# Patient Record
Sex: MALE | Race: WHITE | NOT HISPANIC OR LATINO | URBAN - METROPOLITAN AREA
[De-identification: names, ages, dates, MRNs, and addresses within clinical notes are randomized per-mention and may not be internally consistent; named-entity substitution may affect disease eponyms.]

---

## 2023-07-18 ENCOUNTER — OFFICE VISIT (OUTPATIENT)
Dept: PRIMARY CARE | Facility: CLINIC | Age: 38
End: 2023-07-18
Payer: COMMERCIAL

## 2023-07-18 VITALS
OXYGEN SATURATION: 98 % | WEIGHT: 238 LBS | BODY MASS INDEX: 30.54 KG/M2 | RESPIRATION RATE: 14 BRPM | SYSTOLIC BLOOD PRESSURE: 130 MMHG | HEART RATE: 81 BPM | HEIGHT: 74 IN | DIASTOLIC BLOOD PRESSURE: 82 MMHG

## 2023-07-18 DIAGNOSIS — R21 RASH: ICD-10-CM

## 2023-07-18 DIAGNOSIS — R73.9 HYPERGLYCEMIA: ICD-10-CM

## 2023-07-18 DIAGNOSIS — R63.5 WEIGHT GAIN: ICD-10-CM

## 2023-07-18 DIAGNOSIS — K76.0 HEPATIC STEATOSIS: Primary | ICD-10-CM

## 2023-07-18 PROBLEM — I49.8 ATRIAL ARRHYTHMIA: Status: ACTIVE | Noted: 2022-05-14

## 2023-07-18 PROBLEM — K21.00 GASTROESOPHAGEAL REFLUX DISEASE WITH ESOPHAGITIS: Status: ACTIVE | Noted: 2022-11-09

## 2023-07-18 PROBLEM — G47.00 INSOMNIA: Status: ACTIVE | Noted: 2022-05-14

## 2023-07-18 PROBLEM — F41.9 ANXIETY AND DEPRESSION: Status: ACTIVE | Noted: 2022-05-14

## 2023-07-18 PROBLEM — S33.5XXA LUMBAR SPRAIN: Status: ACTIVE | Noted: 2022-11-09

## 2023-07-18 PROBLEM — S99.912A INJURY OF LEFT ANKLE: Status: ACTIVE | Noted: 2022-11-09

## 2023-07-18 PROBLEM — F32.A ANXIETY AND DEPRESSION: Status: ACTIVE | Noted: 2022-05-14

## 2023-07-18 PROBLEM — H44.819: Status: ACTIVE | Noted: 2022-05-14

## 2023-07-18 PROCEDURE — 99213 OFFICE O/P EST LOW 20 MIN: CPT | Performed by: INTERNAL MEDICINE

## 2023-07-18 PROCEDURE — 1036F TOBACCO NON-USER: CPT | Performed by: INTERNAL MEDICINE

## 2023-07-18 RX ORDER — OMEPRAZOLE 40 MG/1
40 CAPSULE, DELAYED RELEASE ORAL
Qty: 30 CAPSULE | Refills: 11 | COMMUNITY
Start: 2022-08-02 | End: 2023-11-07 | Stop reason: SDUPTHER

## 2023-07-18 RX ORDER — CYCLOBENZAPRINE HCL 10 MG
1 TABLET ORAL DAILY
COMMUNITY
Start: 2019-03-02 | End: 2024-02-15 | Stop reason: WASHOUT

## 2023-07-18 RX ORDER — ASPIRIN 81 MG/1
81 TABLET ORAL
COMMUNITY
Start: 2016-09-23 | End: 2024-02-15 | Stop reason: WASHOUT

## 2023-07-18 NOTE — PATIENT INSTRUCTIONS
THE RASH ON BACK SHOULD BE SEEN BY [facility]ERM, ALONG WITH [facility]ES    2.  FASTING LABS ARE ORDERED FOR YOU    3.  ULTRASOUND LIVER IS ORDERED    4.  FOLLOW UP PENDING LABS AND ULTRASOUND RESULTS, OTHERWISE 2 MONTH FOLLOW UP    5.  PLEASE ASK SLEEPING PARTNER IF YOU STOP BREATHING WHEN YOU SNORE    6.  I SUSPECT THAT IT IS FAT IN THE LIVER THAT IS BOTHERING YOU

## 2023-07-18 NOTE — PROGRESS NOTES
"Subjective   Michael Thomas is a 38 y.o. male who presents for NP ESTABLISH   PT SAYS HE HAS BEEN FEELING FATIGUED ACHY BLOATED 30- 45DAYS   HX OF AFIB   WAS ON PRILOSEC HELPING OUT OF MEDS     HPI   USED TO SEE DR. COMER    TROUBLE WITH STOMACH, FEEL VERY BLOATED, GAINED SOME WEIGHT, DON'T FEEL GOOD    HAVE A WEIRD SPOT ON BACK, DON'T FEEL GOOD, LIKE BREAKING DOWN.    TOOK PRILOSEC IN THE PAST, IT HELPED.    NO HERBALS, NO TRAVEL OUT OF THE COUNTRY.  WAS CARRYING TILE DOWN [employer], TRIPPED AND FELL DOWN STAIRCASE BRUISED RIBS, OCTOBER    LABS AND RADIOGRAPHY FROM [facility] ER REVIEWED REVEAL TRANSAMINITIS, PREVIOUSLY NORMAL IN MARCH 2022 AND EVIDENCE FOR HEPATI\C STEATOSIS      Review of Systems   Constitutional:  Negative for chills, diaphoresis and fever.        PER HPI   Respiratory:  Negative for cough and shortness of breath.    Cardiovascular:  Negative for chest pain and leg swelling.   Gastrointestinal:  Negative for constipation, diarrhea, nausea and vomiting.   Musculoskeletal:  Negative for joint swelling and myalgias.       Objective   /82   Pulse 81   Resp 14   Ht 1.88 m (6' 2\")   Wt 108 kg (238 lb)   SpO2 98%   BMI 30.56 kg/m²     Physical Exam  Vitals reviewed. Nursing note reviewed: OVERWEIGHT.  Constitutional:       General: He is not in acute distress.     Appearance: He is not ill-appearing.   Cardiovascular:      Rate and Rhythm: Normal rate and regular rhythm.      Pulses: Normal pulses.      Heart sounds:      No gallop.   Pulmonary:      Breath sounds: Normal breath sounds. No wheezing, rhonchi or rales.   Abdominal:      General: Abdomen is flat. Bowel sounds are normal. There is no distension.      Palpations: Abdomen is soft. There is no mass.      Tenderness: There is no abdominal tenderness. There is no right CVA tenderness, left CVA tenderness, guarding or rebound.      Comments: NO GROSS HEPATOMEGALY IS APPRECIATED   Musculoskeletal:      Right lower leg: No edema. "      Left lower leg: No edema.   Skin:     Findings: Rash (LARGEB CONFLUENT DARKLY ERYTHEMATOUS MACULAR RASH ON BACK >10 CM WITH STAELLITES, NO EXCORIATION) present.         Assessment/Plan   Problem List Items Addressed This Visit    None  Visit Diagnoses       Hepatic steatosis    -  Primary    Relevant Orders    Comprehensive Metabolic Panel    CBC and Auto Differential    TSH with reflex to Free T4 if abnormal    Lipid Panel    US abdomen limited liver    Weight gain        Relevant Orders    Vitamin B12    Rash        Relevant Orders    Referral to Dermatology    Hyperglycemia        Relevant Orders    Hemoglobin A1C          Patient Instructions    THE RASH ON BACK SHOULD BE SEEN BY DERM, ALONG WITH MOLES    2.  FASTING LABS ARE ORDERED FOR YOU    3.  ULTRASOUND LIVER IS ORDERED    4.  FOLLOW UP PENDING LABS AND ULTRASOUND RESULTS, OTHERWISE 2 MONTH FOLLOW UP    5.  PLEASE ASK SLEEPING PARTNER IF YOU STOP BREATHING WHEN YOU SNORE    6.  I SUSPECT THAT IT IS FAT IN THE LIVER THAT IS BOTHERING YOU

## 2023-07-19 ASSESSMENT — ENCOUNTER SYMPTOMS
JOINT SWELLING: 0
COUGH: 0
CONSTIPATION: 0
SHORTNESS OF BREATH: 0
DIAPHORESIS: 0
NAUSEA: 0
VOMITING: 0
MYALGIAS: 0
DIARRHEA: 0
FEVER: 0
CHILLS: 0

## 2023-07-20 ENCOUNTER — LAB (OUTPATIENT)
Dept: LAB | Facility: LAB | Age: 38
End: 2023-07-20
Payer: COMMERCIAL

## 2023-07-20 DIAGNOSIS — R63.5 WEIGHT GAIN: ICD-10-CM

## 2023-07-20 DIAGNOSIS — R73.9 HYPERGLYCEMIA: ICD-10-CM

## 2023-07-20 DIAGNOSIS — K76.0 HEPATIC STEATOSIS: ICD-10-CM

## 2023-07-20 LAB
ALANINE AMINOTRANSFERASE (SGPT) (U/L) IN SER/PLAS: 91 U/L (ref 10–52)
ALBUMIN (G/DL) IN SER/PLAS: 5 G/DL (ref 3.4–5)
ALKALINE PHOSPHATASE (U/L) IN SER/PLAS: 102 U/L (ref 33–120)
ANION GAP IN SER/PLAS: 16 MMOL/L (ref 10–20)
ASPARTATE AMINOTRANSFERASE (SGOT) (U/L) IN SER/PLAS: 58 U/L (ref 9–39)
BASOPHILS (10*3/UL) IN BLOOD BY AUTOMATED COUNT: 0.04 X10E9/L (ref 0–0.1)
BASOPHILS/100 LEUKOCYTES IN BLOOD BY AUTOMATED COUNT: 0.8 % (ref 0–2)
BILIRUBIN TOTAL (MG/DL) IN SER/PLAS: 0.8 MG/DL (ref 0–1.2)
CALCIUM (MG/DL) IN SER/PLAS: 10.3 MG/DL (ref 8.6–10.3)
CARBON DIOXIDE, TOTAL (MMOL/L) IN SER/PLAS: 29 MMOL/L (ref 21–32)
CHLORIDE (MMOL/L) IN SER/PLAS: 99 MMOL/L (ref 98–107)
CHOLESTEROL (MG/DL) IN SER/PLAS: 294 MG/DL (ref 0–199)
CHOLESTEROL IN HDL (MG/DL) IN SER/PLAS: 73 MG/DL
CHOLESTEROL/HDL RATIO: 4
COBALAMIN (VITAMIN B12) (PG/ML) IN SER/PLAS: 270 PG/ML (ref 211–911)
CREATININE (MG/DL) IN SER/PLAS: 0.88 MG/DL (ref 0.5–1.3)
EOSINOPHILS (10*3/UL) IN BLOOD BY AUTOMATED COUNT: 0.05 X10E9/L (ref 0–0.7)
EOSINOPHILS/100 LEUKOCYTES IN BLOOD BY AUTOMATED COUNT: 1 % (ref 0–6)
ERYTHROCYTE DISTRIBUTION WIDTH (RATIO) BY AUTOMATED COUNT: 12.1 % (ref 11.5–14.5)
ERYTHROCYTE MEAN CORPUSCULAR HEMOGLOBIN CONCENTRATION (G/DL) BY AUTOMATED: 33.5 G/DL (ref 32–36)
ERYTHROCYTE MEAN CORPUSCULAR VOLUME (FL) BY AUTOMATED COUNT: 94 FL (ref 80–100)
ERYTHROCYTES (10*6/UL) IN BLOOD BY AUTOMATED COUNT: 4.63 X10E12/L (ref 4.5–5.9)
GFR MALE: >90 ML/MIN/1.73M2
GLUCOSE (MG/DL) IN SER/PLAS: 86 MG/DL (ref 74–99)
HEMATOCRIT (%) IN BLOOD BY AUTOMATED COUNT: 43.6 % (ref 41–52)
HEMOGLOBIN (G/DL) IN BLOOD: 14.6 G/DL (ref 13.5–17.5)
IMMATURE GRANULOCYTES/100 LEUKOCYTES IN BLOOD BY AUTOMATED COUNT: 0.4 % (ref 0–0.9)
LDL: 196 MG/DL (ref 0–99)
LEUKOCYTES (10*3/UL) IN BLOOD BY AUTOMATED COUNT: 5 X10E9/L (ref 4.4–11.3)
LYMPHOCYTES (10*3/UL) IN BLOOD BY AUTOMATED COUNT: 1.15 X10E9/L (ref 1.2–4.8)
LYMPHOCYTES/100 LEUKOCYTES IN BLOOD BY AUTOMATED COUNT: 23 % (ref 13–44)
MONOCYTES (10*3/UL) IN BLOOD BY AUTOMATED COUNT: 0.44 X10E9/L (ref 0.1–1)
MONOCYTES/100 LEUKOCYTES IN BLOOD BY AUTOMATED COUNT: 8.8 % (ref 2–10)
NEUTROPHILS (10*3/UL) IN BLOOD BY AUTOMATED COUNT: 3.3 X10E9/L (ref 1.2–7.7)
NEUTROPHILS/100 LEUKOCYTES IN BLOOD BY AUTOMATED COUNT: 66 % (ref 40–80)
PLATELETS (10*3/UL) IN BLOOD AUTOMATED COUNT: 226 X10E9/L (ref 150–450)
POTASSIUM (MMOL/L) IN SER/PLAS: 4.1 MMOL/L (ref 3.5–5.3)
PROTEIN TOTAL: 8 G/DL (ref 6.4–8.2)
SODIUM (MMOL/L) IN SER/PLAS: 140 MMOL/L (ref 136–145)
THYROTROPIN (MIU/L) IN SER/PLAS BY DETECTION LIMIT <= 0.05 MIU/L: 2.7 MIU/L (ref 0.44–3.98)
TRIGLYCERIDE (MG/DL) IN SER/PLAS: 126 MG/DL (ref 0–149)
UREA NITROGEN (MG/DL) IN SER/PLAS: 8 MG/DL (ref 6–23)
VLDL: 25 MG/DL (ref 0–40)

## 2023-07-20 PROCEDURE — 36415 COLL VENOUS BLD VENIPUNCTURE: CPT

## 2023-07-20 PROCEDURE — 80053 COMPREHEN METABOLIC PANEL: CPT

## 2023-07-20 PROCEDURE — 83036 HEMOGLOBIN GLYCOSYLATED A1C: CPT

## 2023-07-20 PROCEDURE — 85025 COMPLETE CBC W/AUTO DIFF WBC: CPT

## 2023-07-20 PROCEDURE — 84443 ASSAY THYROID STIM HORMONE: CPT

## 2023-07-20 PROCEDURE — 82607 VITAMIN B-12: CPT

## 2023-07-20 PROCEDURE — 80061 LIPID PANEL: CPT

## 2023-07-21 LAB
ESTIMATED AVERAGE GLUCOSE FOR HBA1C: 111 MG/DL
HEMOGLOBIN A1C/HEMOGLOBIN TOTAL IN BLOOD: 5.5 %

## 2023-07-24 DIAGNOSIS — R74.01 TRANSAMINITIS: Primary | ICD-10-CM

## 2023-11-05 PROBLEM — K76.0 STEATOSIS OF LIVER: Status: ACTIVE | Noted: 2023-07-20

## 2023-11-05 PROBLEM — R21 RASH: Status: ACTIVE | Noted: 2023-11-05

## 2023-11-05 PROBLEM — R63.5 WEIGHT GAIN: Status: ACTIVE | Noted: 2023-07-20

## 2023-11-05 PROBLEM — R73.9 HYPERGLYCEMIA: Status: ACTIVE | Noted: 2023-07-20

## 2023-11-05 RX ORDER — ACETAMINOPHEN 500 MG
500 TABLET ORAL EVERY 6 HOURS PRN
COMMUNITY
Start: 2022-11-09 | End: 2024-02-15 | Stop reason: WASHOUT

## 2023-11-05 RX ORDER — LIDOCAINE 50 MG/G
PATCH TOPICAL
COMMUNITY
Start: 2022-11-09 | End: 2024-02-15 | Stop reason: WASHOUT

## 2023-11-05 RX ORDER — NEOMYCIN SULFATE, POLYMYXIN B SULFATE, AND DEXAMETHASONE 3.5; 10000; 1 MG/G; [USP'U]/G; MG/G
OINTMENT OPHTHALMIC
COMMUNITY
Start: 2023-02-23 | End: 2024-02-15 | Stop reason: WASHOUT

## 2023-11-07 ENCOUNTER — OFFICE VISIT (OUTPATIENT)
Dept: GASTROENTEROLOGY | Facility: CLINIC | Age: 38
End: 2023-11-07
Payer: COMMERCIAL

## 2023-11-07 VITALS
SYSTOLIC BLOOD PRESSURE: 131 MMHG | BODY MASS INDEX: 30.03 KG/M2 | WEIGHT: 234 LBS | HEART RATE: 85 BPM | DIASTOLIC BLOOD PRESSURE: 84 MMHG | HEIGHT: 74 IN

## 2023-11-07 DIAGNOSIS — K76.0 STEATOSIS OF LIVER: ICD-10-CM

## 2023-11-07 DIAGNOSIS — K21.00 GASTROESOPHAGEAL REFLUX DISEASE WITH ESOPHAGITIS WITHOUT HEMORRHAGE: Primary | ICD-10-CM

## 2023-11-07 PROCEDURE — 1036F TOBACCO NON-USER: CPT | Performed by: INTERNAL MEDICINE

## 2023-11-07 PROCEDURE — 99204 OFFICE O/P NEW MOD 45 MIN: CPT | Performed by: INTERNAL MEDICINE

## 2023-11-07 RX ORDER — OMEPRAZOLE 40 MG/1
40 CAPSULE, DELAYED RELEASE ORAL DAILY
Qty: 90 EACH | Refills: 3 | Status: SHIPPED | OUTPATIENT
Start: 2023-11-07 | End: 2024-02-15 | Stop reason: SDUPTHER

## 2023-11-07 ASSESSMENT — ENCOUNTER SYMPTOMS
NERVOUS/ANXIOUS: 1
COLOR CHANGE: 0
SORE THROAT: 0
FEVER: 0
SHORTNESS OF BREATH: 0
EYE ITCHING: 0
FATIGUE: 0
ADENOPATHY: 0
COUGH: 0
HEMATURIA: 0
SLEEP DISTURBANCE: 1
ROS GI COMMENTS: AS IN HPI
HEADACHES: 0
UNEXPECTED WEIGHT CHANGE: 1
BRUISES/BLEEDS EASILY: 0
FLANK PAIN: 0
CONFUSION: 0
ARTHRALGIAS: 1
SEIZURES: 0
DYSURIA: 0
PALPITATIONS: 0
WHEEZING: 0
CHILLS: 0
BACK PAIN: 0
EYE REDNESS: 0
HALLUCINATIONS: 0
EYE DISCHARGE: 0

## 2023-11-07 NOTE — ASSESSMENT & PLAN NOTE
Advised on diet and wt loss.  Wt loss will help both GERD and NAFLD.  Mediterranean diet.  Acute hep panel next time.

## 2023-11-07 NOTE — PATIENT INSTRUCTIONS
Restart omeprazole 40 mg daily, 1/2 hr prior to breakfast.  Return to clinic in 3 months.  Repeat EGD at that time.

## 2023-11-07 NOTE — PROGRESS NOTES
Gastroenterology Office Visit     History of Present Illness:   Michael Thomas is a 38 y.o. male who presents to GI clinic for reflux esophagitis.  Had two EGDs in 5/22 and 7/22 at [facility]; see below for results.  He was given omeprazole 40 mg daily for 1 month after his second EGD.  He has not taken any PPI since.  He had no follow-up with [facility] GI after that.  Currently, he may go a week without any heartburn.  But then he will have it 3 or 4 days in a row.  No particular time of day.  It has been largely inconsistent.  He does drink alcohol socially, and 3 large cups of coffee daily.  He denies associated dysphagia, hematemesis, regurgitation and wt loss.       Has gained 20 lbs over 1 yr.     Review of Systems  Review of Systems   Constitutional:  Positive for unexpected weight change. Negative for chills, fatigue and fever.   HENT:  Negative for ear pain, nosebleeds and sore throat.    Eyes:  Negative for discharge, redness and itching.   Respiratory:  Negative for cough, shortness of breath and wheezing.    Cardiovascular:  Negative for chest pain, palpitations and leg swelling.   Gastrointestinal:         As in HPI   Endocrine: Negative for cold intolerance and heat intolerance.   Genitourinary:  Negative for dysuria, flank pain and hematuria.   Musculoskeletal:  Positive for arthralgias. Negative for back pain and gait problem.   Skin:  Negative for color change and rash.   Neurological:  Negative for seizures, syncope and headaches.   Hematological:  Negative for adenopathy. Does not bruise/bleed easily.   Psychiatric/Behavioral:  Positive for sleep disturbance. Negative for confusion and hallucinations. The patient is nervous/anxious.        Past Medical History  Anxiety  Depression  GERD  Aflutter     Problem List  Patient Active Problem List   Diagnosis    Anxiety and depression    Atrial arrhythmia    Atrial flutter (CMS/HCC)    Dizziness    Gastroesophageal reflux disease with esophagitis     Hemophthalmos    Injury of left ankle    Insomnia    Lumbar sprain    Paroxysmal atrial fibrillation (CMS/HCC)    Hyperglycemia    Rash    Steatosis of liver    Weight gain       Past Surgical History  EGD    Social History   reports that he has never smoked. He has never used smokeless tobacco. He reports current alcohol use. He reports that he does not currently use drugs.     Family History     Denies FH CRC, IBD, celiac disease    Allergies  No Known Allergies    Medications  Current Outpatient Medications   Medication Instructions    acetaminophen (TYLENOL) 500 mg, oral, Every 6 hours PRN    aspirin 81 mg, oral, Daily RT    cyclobenzaprine (Flexeril) 10 mg tablet 1 tablet, oral, Daily    lidocaine (Lidoderm) 5 % patch Apply 1 Patch as directed once daily. to affected area. Remove patch after 12 hours.    neomycin-polymyxin B-dexameth (Polydex) 3.5 mg/g-10,000 unit/g-0.1 % ointment ophthalmic ointment LOCATION: BOTH EYES. APPLY TO EFFECTED AREA TWICE A DAY    omeprazole (PRILOSEC) 40 mg, oral, Daily, Do not crush or chew.        Objective   Visit Vitals  /84   Pulse 85        Physical Exam  General appearance: No acute distress, cooperative.  Eyes: Nonicteric, no redness or proptosis  Ears, nose, mouth, and throat: Moist mucous membranes, tongue normal; no oral lesions; Mallampati 2  Neck: Supple, no lymphadenopathy or thyromegaly  Lungs: Clear to auscultation bilaterally  CV: Regular rate and rhythm, no murmur; no pitting edema in the lower extremities  Abd: soft, non-tender; non-distended; normal active bowel sounds; no scars  Skin: No rashes; no liver stigmata; +small macule in LUQ  MSK: No deformities or joint edema/redness/tenderness  Neuro: Alert and oriented ×3, normal gait       LABS  Component      Latest Ref Rng 7/20/2023   WBC      4.4 - 11.3 x10E9/L 5.0    RBC      4.50 - 5.90 x10E12/L 4.63    HEMOGLOBIN      13.5 - 17.5 g/dL 14.6    HEMATOCRIT      41.0 - 52.0 % 43.6    MCV      80 - 100 fL 94     MCHC      32.0 - 36.0 g/dL 33.5    Platelets      150 - 450 x10E9/L 226        Component      Latest Ref Rng 7/20/2023   GLUCOSE      74 - 99 mg/dL 86    SODIUM      136 - 145 mmol/L 140    POTASSIUM      3.5 - 5.3 mmol/L 4.1    CHLORIDE      98 - 107 mmol/L 99    Bicarbonate      21 - 32 mmol/L 29    Anion Gap      10 - 20 mmol/L 16    Blood Urea Nitrogen      6 - 23 mg/dL 8    Creatinine      0.50 - 1.30 mg/dL 0.88    GFR MALE      >90 mL/min/1.73m2 >90    Calcium      8.6 - 10.3 mg/dL 10.3    Albumin      3.4 - 5.0 g/dL 5.0    Alkaline Phosphatase      33 - 120 U/L 102    Total Protein      6.4 - 8.2 g/dL 8.0    AST      9 - 39 U/L 58 (H)    Bilirubin Total      0.0 - 1.2 mg/dL 0.8    ALT      10 - 52 U/L 91 (H)       Transaminases elevated in 10/22 also    Radiology  RUQ US 7/23: +HM, steatosis    Endoscopy    EGD 7/22: normal S/D; grade D esophagitis; stenosis at [facility]- not dilated  EGD 5/22 at [facility]: esophageal ulcers    Assessment/Plan   Michael Thomas is a 38 y.o. male who presents to GI clinic for GERD/ reflux esophagitis.    Gastroesophageal reflux disease with esophagitis  Restart omeprazole 40 mg daily, 1/2 hr prior to breakfast.  Return to clinic in 3 months.  Repeat EGD at that time.       Steatosis of liver  Advised on diet and wt loss.  Wt loss will help both GERD and NAFLD.  Mediterranean diet.  Acute hep panel next time.         Umair Pichardo MD

## 2024-01-04 ENCOUNTER — APPOINTMENT (OUTPATIENT)
Dept: GASTROENTEROLOGY | Facility: CLINIC | Age: 39
End: 2024-01-04
Payer: COMMERCIAL

## 2024-01-25 ENCOUNTER — APPOINTMENT (OUTPATIENT)
Dept: GASTROENTEROLOGY | Facility: CLINIC | Age: 39
End: 2024-01-25
Payer: COMMERCIAL

## 2024-02-15 ENCOUNTER — TELEMEDICINE (OUTPATIENT)
Dept: GASTROENTEROLOGY | Facility: CLINIC | Age: 39
End: 2024-02-15
Payer: COMMERCIAL

## 2024-02-15 ENCOUNTER — TELEPHONE (OUTPATIENT)
Dept: GASTROENTEROLOGY | Facility: CLINIC | Age: 39
End: 2024-02-15

## 2024-02-15 DIAGNOSIS — K21.00 GASTROESOPHAGEAL REFLUX DISEASE WITH ESOPHAGITIS WITHOUT HEMORRHAGE: Primary | ICD-10-CM

## 2024-02-15 PROCEDURE — 99214 OFFICE O/P EST MOD 30 MIN: CPT | Performed by: INTERNAL MEDICINE

## 2024-02-15 PROCEDURE — 1036F TOBACCO NON-USER: CPT | Performed by: INTERNAL MEDICINE

## 2024-02-15 RX ORDER — OMEPRAZOLE 40 MG/1
40 CAPSULE, DELAYED RELEASE ORAL
Qty: 90 CAPSULE | Refills: 3 | Status: SHIPPED | OUTPATIENT
Start: 2024-02-15 | End: 2024-03-01 | Stop reason: SDUPTHER

## 2024-02-15 NOTE — PROGRESS NOTES
Gastroenterology Office Visit     History of Present Illness:   Since last OV in 11/23, patient has restarted PPI daily.  Has not had any b/t sx's since restarting the medication.  Has bronchitis currently.  He has lost weight since the beginning of the year by cutting out soda, and other foods from diet.  Has lost 20 lbs.      OV in 11/23:  Michael Thomas is a 38 y.o. male who presents to GI clinic for reflux esophagitis.  Had two EGDs in 5/22 and 7/22 at [facility]; see below for results.  He was given omeprazole 40 mg daily for 1 month after his second EGD.  He has not taken any PPI since.  He had no follow-up with [facility] GI after that.  Currently, he may go a week without any heartburn.  But then he will have it 3 or 4 days in a row.  No particular time of day.  It has been largely inconsistent.  He does drink alcohol socially, and 3 large cups of coffee daily.  He denies associated dysphagia, hematemesis, regurgitation and wt loss.       Has gained 20 lbs over 1 yr.     Review of Systems  Constitutional: denies fever/ chills, night sweats, +wt loss   Respiratory: +SOB, fatigue, chest discomfort   CV: denies LE edema  Neuro: denies weakness and difficulty walking    Past Medical History  Anxiety  Depression  GERD  Aflutter     Problem List  Patient Active Problem List   Diagnosis    Anxiety and depression    Atrial arrhythmia    Atrial flutter (CMS/HCC)    Dizziness    Gastroesophageal reflux disease with esophagitis    Hemophthalmos    Injury of left ankle    Insomnia    Lumbar sprain    Paroxysmal atrial fibrillation (CMS/HCC)    Hyperglycemia    Rash    Steatosis of liver    Weight gain       Past Surgical History  EGD    Social History   reports that he has never smoked. He has never used smokeless tobacco. He reports current alcohol use. He reports that he does not currently use drugs.     Family History     Denies FH CRC, IBD, celiac disease    Allergies  No Known Allergies    Medications  Current  Outpatient Medications on File Prior to Visit   Medication Sig Dispense Refill    acetaminophen (Tylenol) 500 mg tablet Take 1 tablet (500 mg) by mouth every 6 hours if needed for mild pain (1 - 3) or moderate pain (4 - 6).      aspirin 81 mg EC tablet Take 1 tablet (81 mg) by mouth once daily.      cyclobenzaprine (Flexeril) 10 mg tablet Take 1 tablet (10 mg) by mouth once daily.      lidocaine (Lidoderm) 5 % patch Apply 1 Patch as directed once daily. to affected area. Remove patch after 12 hours.      neomycin-polymyxin B-dexameth (Polydex) 3.5 mg/g-10,000 unit/g-0.1 % ointment ophthalmic ointment LOCATION: BOTH EYES. APPLY TO EFFECTED AREA TWICE A DAY      omeprazole (PriLOSEC) 40 mg DR capsule Take 1 capsule (40 mg) by mouth once daily. Do not crush or chew. 90 each 3     No current facility-administered medications on file prior to visit.            Objective   No VS          LABS  Component      Latest Ref [facility] 7/20/2023   WBC      4.4 - 11.3 x10E9/L 5.0    RBC      4.50 - 5.90 x10E12/L 4.63    HEMOGLOBIN      13.5 - 17.5 g/dL 14.6    HEMATOCRIT      41.0 - 52.0 % 43.6    MCV      80 - 100 fL 94    MCHC      32.0 - 36.0 g/dL 33.5    Platelets      150 - 450 x10E9/L 226        Component      Latest Ref [facility] 7/20/2023   GLUCOSE      74 - 99 mg/dL 86    SODIUM      136 - 145 mmol/L 140    POTASSIUM      3.5 - 5.3 mmol/L 4.1    CHLORIDE      98 - 107 mmol/L 99    Bicarbonate      21 - 32 mmol/L 29    Anion Gap      10 - 20 mmol/L 16    Blood Urea Nitrogen      6 - 23 mg/dL 8    Creatinine      0.50 - 1.30 mg/dL 0.88    GFR MALE      >90 mL/min/1.73m2 >90    Calcium      8.6 - 10.3 mg/dL 10.3    Albumin      3.4 - 5.0 g/dL 5.0    Alkaline Phosphatase      33 - 120 U/L 102    Total Protein      6.4 - 8.2 g/dL 8.0    AST      9 - 39 U/L 58 (H)    Bilirubin Total      0.0 - 1.2 mg/dL 0.8    ALT      10 - 52 U/L 91 (H)       Transaminases elevated in 10/22 also    Radiology  RUQ US 7/23: +HM,  steatosis    Endoscopy    EGD 7/22: normal S/D; grade D esophagitis; stenosis at [facility]- not dilated  EGD 5/22 at [facility]: esophageal ulcers    Assessment/Plan   Michael Thomas is a 38 y.o. male who presents to GI clinic for GERD/ reflux esophagitis.  Improved since restarting PPI daily.      Gastroesophageal reflux disease with esophagitis  Continue omeprazole 40 mg daily, 1/2 hr prior to breakfast.  Schedule EGD to follow up Class D esophagitis.            Umair Pichardo MD

## 2024-02-20 ENCOUNTER — ANESTHESIA EVENT (OUTPATIENT)
Dept: GASTROENTEROLOGY | Facility: EXTERNAL LOCATION | Age: 39
End: 2024-02-20

## 2024-03-01 ENCOUNTER — ANESTHESIA (OUTPATIENT)
Dept: GASTROENTEROLOGY | Facility: EXTERNAL LOCATION | Age: 39
End: 2024-03-01

## 2024-03-01 ENCOUNTER — HOSPITAL ENCOUNTER (OUTPATIENT)
Dept: GASTROENTEROLOGY | Facility: EXTERNAL LOCATION | Age: 39
Discharge: HOME | End: 2024-03-01
Payer: COMMERCIAL

## 2024-03-01 VITALS
BODY MASS INDEX: 30.03 KG/M2 | RESPIRATION RATE: 18 BRPM | SYSTOLIC BLOOD PRESSURE: 132 MMHG | OXYGEN SATURATION: 98 % | HEART RATE: 82 BPM | HEIGHT: 74 IN | DIASTOLIC BLOOD PRESSURE: 89 MMHG | TEMPERATURE: 98.2 F | WEIGHT: 234 LBS

## 2024-03-01 DIAGNOSIS — K21.00 GASTROESOPHAGEAL REFLUX DISEASE WITH ESOPHAGITIS WITHOUT HEMORRHAGE: ICD-10-CM

## 2024-03-01 PROCEDURE — 88341 IMHCHEM/IMCYTCHM EA ADD ANTB: CPT

## 2024-03-01 PROCEDURE — 0753T DGTZ GLS MCRSCP SLD LEVEL IV: CPT

## 2024-03-01 PROCEDURE — 88312 SPECIAL STAINS GROUP 1: CPT | Performed by: PATHOLOGY

## 2024-03-01 PROCEDURE — 88305 TISSUE EXAM BY PATHOLOGIST: CPT | Performed by: PATHOLOGY

## 2024-03-01 PROCEDURE — 88305 TISSUE EXAM BY PATHOLOGIST: CPT

## 2024-03-01 PROCEDURE — 88312 SPECIAL STAINS GROUP 1: CPT

## 2024-03-01 PROCEDURE — 88342 IMHCHEM/IMCYTCHM 1ST ANTB: CPT | Performed by: PATHOLOGY

## 2024-03-01 PROCEDURE — 43239 EGD BIOPSY SINGLE/MULTIPLE: CPT | Performed by: INTERNAL MEDICINE

## 2024-03-01 PROCEDURE — 0756T DGTZ GLS MCRSCP SLD SPC GRPI: CPT

## 2024-03-01 RX ORDER — SODIUM CHLORIDE 9 MG/ML
20 INJECTION, SOLUTION INTRAVENOUS CONTINUOUS
Status: DISCONTINUED | OUTPATIENT
Start: 2024-03-01 | End: 2024-03-02 | Stop reason: HOSPADM

## 2024-03-01 RX ORDER — OMEPRAZOLE 40 MG/1
40 CAPSULE, DELAYED RELEASE ORAL
Qty: 180 CAPSULE | Refills: 1 | Status: SHIPPED | OUTPATIENT
Start: 2024-03-01 | End: 2025-03-01

## 2024-03-01 RX ORDER — LIDOCAINE HYDROCHLORIDE 20 MG/ML
INJECTION, SOLUTION INFILTRATION; PERINEURAL AS NEEDED
Status: DISCONTINUED | OUTPATIENT
Start: 2024-03-01 | End: 2024-03-01

## 2024-03-01 RX ORDER — ONDANSETRON HYDROCHLORIDE 2 MG/ML
4 INJECTION, SOLUTION INTRAVENOUS ONCE AS NEEDED
Status: DISCONTINUED | OUTPATIENT
Start: 2024-03-01 | End: 2024-03-02 | Stop reason: HOSPADM

## 2024-03-01 RX ORDER — SODIUM CHLORIDE 9 MG/ML
INJECTION, SOLUTION INTRAVENOUS CONTINUOUS PRN
Status: DISCONTINUED | OUTPATIENT
Start: 2024-03-01 | End: 2024-03-01

## 2024-03-01 RX ORDER — PROPOFOL 10 MG/ML
INJECTION, EMULSION INTRAVENOUS AS NEEDED
Status: DISCONTINUED | OUTPATIENT
Start: 2024-03-01 | End: 2024-03-01

## 2024-03-01 RX ADMIN — PROPOFOL 50 MG: 10 INJECTION, EMULSION INTRAVENOUS at 11:08

## 2024-03-01 RX ADMIN — LIDOCAINE HYDROCHLORIDE 2 ML: 20 INJECTION, SOLUTION INFILTRATION; PERINEURAL at 11:03

## 2024-03-01 RX ADMIN — PROPOFOL 50 MG: 10 INJECTION, EMULSION INTRAVENOUS at 11:07

## 2024-03-01 RX ADMIN — PROPOFOL 50 MG: 10 INJECTION, EMULSION INTRAVENOUS at 11:06

## 2024-03-01 RX ADMIN — PROPOFOL 150 MG: 10 INJECTION, EMULSION INTRAVENOUS at 11:03

## 2024-03-01 RX ADMIN — PROPOFOL 100 MG: 10 INJECTION, EMULSION INTRAVENOUS at 11:04

## 2024-03-01 RX ADMIN — SODIUM CHLORIDE: 9 INJECTION, SOLUTION INTRAVENOUS at 10:43

## 2024-03-01 RX ADMIN — PROPOFOL 50 MG: 10 INJECTION, EMULSION INTRAVENOUS at 11:05

## 2024-03-01 ASSESSMENT — PAIN - FUNCTIONAL ASSESSMENT
PAIN_FUNCTIONAL_ASSESSMENT: 0-10

## 2024-03-01 ASSESSMENT — PAIN SCALES - GENERAL
PAINLEVEL_OUTOF10: 0 - NO PAIN
PAIN_LEVEL: 0
PAINLEVEL_OUTOF10: 0 - NO PAIN
PAINLEVEL_OUTOF10: 0 - NO PAIN

## 2024-03-01 ASSESSMENT — COLUMBIA-SUICIDE SEVERITY RATING SCALE - C-SSRS
6. HAVE YOU EVER DONE ANYTHING, STARTED TO DO ANYTHING, OR PREPARED TO DO ANYTHING TO END YOUR LIFE?: NO
1. IN THE PAST MONTH, HAVE YOU WISHED YOU WERE DEAD OR WISHED YOU COULD GO TO SLEEP AND NOT WAKE UP?: NO
2. HAVE YOU ACTUALLY HAD ANY THOUGHTS OF KILLING YOURSELF?: NO

## 2024-03-01 NOTE — H&P
Outpatient Hospital Procedure H&P    Patient Profile-Procedures  Initial Info  Patient Demographics  Name Michael Thoams  Date of Birth 1985  MRN [id number]  Address   [address] [address] [id number] [facility] [address]    Primary Phone Number [phone]  Secondary Phone Number    PCP Jonh Mckenna    Procedure(s):  EGD  Primary contact name and number   Extended Emergency Contact Information  Primary Emergency Contact: Kiersten Cavanaugh   [place]  Mobile Phone: [phone]  Relation: Significant Other    General Health  Weight   Vitals:    03/01/24 1043   Weight: 106 kg (234 lb)     BMI Body mass index is 30.04 kg/m².    Allergies  No Known Allergies    Past Medical History   Past Medical History:   Diagnosis Date    GERD (gastroesophageal reflux disease)        Provider assessment  Diagnosis: GERD, reflux esophagitis    Medication Reviewed - yes  Prior to Admission medications    Medication Sig Start Date End Date Taking? Authorizing Provider   omeprazole (PriLOSEC) 40 mg DR capsule Take 1 capsule (40 mg) by mouth once daily in the morning. Take before meals. Do not crush or chew. 2/15/24 2/14/25 Yes Umair Pichardo MD       Physical Exam  Vitals:    03/01/24 1043   BP: (!) 152/92   Pulse: 79   Resp: 13   Temp: 36.5 °C (97.7 °F)   SpO2: 97%        General: A&Ox3, NAD.  CV: RRR. No murmur.  Resp: CTA bilaterally. No wheezing, rhonchi or rales.   Extrem: No edema.       Oropharyngeal Classification II (hard and soft palate, upper portion of tonsils anduvula visible)  ASA PS Classification 2  Sedation Plan Deep  Procedure Plan - pre-procedural (re)assesment completed by physician:  discharge/transfer patient when discharge criteria met    Umair Pichardo MD  3/1/2024 10:55 AM

## 2024-03-01 NOTE — DISCHARGE INSTRUCTIONS
Patient Instructions Post Procedure      The anesthetics, sedatives or narcotics which were given to you today will be acting in your body for the next 24 hours, so you might feel a little sleepy or groggy.  This feeling should slowly wear off. Carefully read and follow the instructions.     You received sedation today:  - Do not drive or operate any machinery or power tools of any kind.   - No alcoholic beverages today, not even beer or wine.  - Do not make any important decisions or sign any legal documents.  - No over the counter medications that contain alcohol or that may cause drowsiness.    While it is common to experience mild to moderate abdominal distention, gas, or belching after your procedure, if any of these symptoms occur following discharge from the GI Lab or within one week of having your procedure, call the [facility] to be advised whether a visit to your nearest Urgent Care or Emergency Department is indicated.  Take this paper with you if you go.   - If you develop an allergic reaction to the medications that were given during your procedure such as difficulty breathing, rash, hives, severe nausea, vomiting or lightheadedness.  - If you experience chest pain, shortness of breath, severe abdominal pain, fevers and chills.  -If you develop signs and symptoms of bleeding such as blood in your spit, if your stools turn black, tarry, or bloody  - If you have not urinated within 8 hours following your procedure.  - If your IV site becomes painful, red, inflamed, or looks infected.    If you received a biopsy/polypectomy/sphincterotomy the following instructions apply below:  __ Do not use Aspirin containing products, non-steroidal medications or anti-coagulants for one week following your procedure. (Examples of these types of medications are: Advil, Arthrotec, Aleve, Coumadin, Ecotrin, Heparin, Ibuprofen, Indocin, Motrin, Naprosyn, Nuprin, Plavix, Vioxx, and Voltarin, or their generic  forms.  This list is not all-inclusive.  Check with your physician or pharmacist before resuming medications.)   __ Eat a soft diet today.  Avoid foods that are poorly digested for the next 24 hours.  These foods would include: nuts, beans, lettuce, red meats, and fried foods. Start with liquids and advance your diet as tolerated, gradually work up to eating solids.   __ Do not have a Barium Study or Enema for one week.    Your physician recommends the additional following instructions:    -You have a contact number available for emergencies. The signs and symptoms of potential delayed complications were discussed with you. You may return to normal activities tomorrow.  -Resume your previous diet or other if specified.  -Continue your present medications.   -We are waiting for your pathology results, if applicable.  -The findings and recommendations have been discussed with you and/or family.  - Please see Medication Reconciliation Form for new medication/medications prescribed.     If you experience any problems or have any questions following discharge from the GI Lab, please call: [phone] from 7 am- 4:30 pm.  In the event of an emergency please go to the closest Emergency Department or call Dr. Pichardo [phone]

## 2024-03-01 NOTE — ANESTHESIA PREPROCEDURE EVALUATION
Patient: Michael Thomas    Procedure Information       Date/Time: 03/01/24 1100    Scheduled providers: Umair Pichardo MD; Adrienne Peoples RN    Procedure: EGD    Location: [facility]            Relevant Problems   Anesthesia (within normal limits)      Cardiovascular   (+) Atrial arrhythmia   (+) Atrial flutter (CMS/HCC)   (+) Paroxysmal atrial fibrillation (CMS/HCC)      Endocrine (within normal limits)      GI   (+) Gastroesophageal reflux disease with esophagitis      /Renal   (+) Steatosis of liver      Neuro/Psych   (+) Anxiety and depression      Pulmonary (within normal limits)      GI/Hepatic   (+) Steatosis of liver      Hematology (within normal limits)      Musculoskeletal (within normal limits)      Eyes, Ears, Nose, and Throat (within normal limits)      Infectious Disease (within normal limits)       Clinical information reviewed:    Allergies                NPO Detail:  No data recorded     Physical Exam    Airway  Mallampati: II  TM distance: >3 FB  Neck ROM: full     Cardiovascular - normal exam     Dental - normal exam     Pulmonary    Abdominal - normal exam           Anesthesia Plan    History of general anesthesia?: no  History of complications of general anesthesia?: no    ASA 2     MAC     intravenous induction   Anesthetic plan and risks discussed with patient.

## 2024-03-01 NOTE — ANESTHESIA POSTPROCEDURE EVALUATION
Patient: Michael Thomas    Procedure Summary       Date: 03/01/24 Room / Location: [facility]    Anesthesia Start: 1100 Anesthesia Stop: 1114    Procedure: EGD Diagnosis: Gastroesophageal reflux disease with esophagitis without hemorrhage    Scheduled Providers: Umair Pichardo MD; Adrienne Peoples RN Responsible Provider: MARY Arguello    Anesthesia Type: MAC ASA Status: 2            Anesthesia Type: MAC    Vitals Value Taken Time   /80 03/01/24 1114   Temp 36.8 °C (98.2 °F) 03/01/24 1113   Pulse 80 03/01/24 1114   Resp 22 03/01/24 1114   SpO2 94 03/01/24 1114       Anesthesia Post Evaluation    Patient location during evaluation: PACU  Patient participation: waiting for patient participation  Level of consciousness: responsive to physical stimuli  Pain score: 0  Pain management: adequate  Airway patency: patent  Cardiovascular status: blood pressure returned to baseline  Respiratory status: acceptable  Hydration status: acceptable  Postoperative Nausea and Vomiting: none        No notable events documented.

## 2024-03-01 NOTE — PROGRESS NOTES
Had EGD today- class C esophagitis.  I have increased his PPI to bid.  He needs an EGD again in 12 weeks.  Order placed, please call to schedule.    Medication e-scripted to pharmacy.    Umair Pichardo MD

## 2024-03-04 ENCOUNTER — TELEPHONE (OUTPATIENT)
Dept: GASTROENTEROLOGY | Facility: CLINIC | Age: 39
End: 2024-03-04
Payer: COMMERCIAL

## 2024-03-04 NOTE — ADDENDUM NOTE
Encounter addended by: Adrienne Peoples RN on: 3/4/2024 12:24 PM   Actions taken: Contacts section saved, Flowsheet accepted

## 2024-03-18 LAB
LAB AP ASR DISCLAIMER: NORMAL
LABORATORY COMMENT REPORT: NORMAL
PATH REPORT.COMMENTS IMP SPEC: NORMAL
PATH REPORT.FINAL DX SPEC: NORMAL
PATH REPORT.GROSS SPEC: NORMAL
PATH REPORT.RELEVANT HX SPEC: NORMAL
PATH REPORT.TOTAL CANCER: NORMAL

## 2024-03-18 PROCEDURE — 88342 IMHCHEM/IMCYTCHM 1ST ANTB: CPT

## 2024-03-18 PROCEDURE — 88341 IMHCHEM/IMCYTCHM EA ADD ANTB: CPT

## 2024-03-18 PROCEDURE — 88342 IMHCHEM/IMCYTCHM 1ST ANTB: CPT | Performed by: PATHOLOGY

## 2024-03-18 PROCEDURE — 0760T DGTZ GLS MCRSCP SL IMM 1ST: CPT

## 2024-03-18 PROCEDURE — 88341 IMHCHEM/IMCYTCHM EA ADD ANTB: CPT | Performed by: PATHOLOGY

## 2024-05-16 RX ORDER — ONDANSETRON 4 MG/1
TABLET, ORALLY DISINTEGRATING ORAL
COMMUNITY
Start: 2024-02-06

## 2024-05-16 RX ORDER — ALBUTEROL SULFATE 90 UG/1
2 AEROSOL, METERED RESPIRATORY (INHALATION) 4 TIMES DAILY PRN
COMMUNITY
Start: 2024-02-06

## 2024-05-18 ENCOUNTER — ANESTHESIA EVENT (OUTPATIENT)
Dept: GASTROENTEROLOGY | Facility: EXTERNAL LOCATION | Age: 39
End: 2024-05-18

## 2024-05-31 ENCOUNTER — APPOINTMENT (OUTPATIENT)
Dept: GASTROENTEROLOGY | Facility: EXTERNAL LOCATION | Age: 39
End: 2024-05-31
Payer: COMMERCIAL

## 2024-05-31 ENCOUNTER — ANESTHESIA (OUTPATIENT)
Dept: GASTROENTEROLOGY | Facility: EXTERNAL LOCATION | Age: 39
End: 2024-05-31

## 2024-06-28 ENCOUNTER — APPOINTMENT (OUTPATIENT)
Dept: GASTROENTEROLOGY | Facility: EXTERNAL LOCATION | Age: 39
End: 2024-06-28
Payer: COMMERCIAL

## 2024-12-11 DIAGNOSIS — K21.00 GASTROESOPHAGEAL REFLUX DISEASE WITH ESOPHAGITIS WITHOUT HEMORRHAGE: ICD-10-CM

## 2024-12-13 RX ORDER — OMEPRAZOLE 40 MG/1
40 CAPSULE, DELAYED RELEASE ORAL
Qty: 180 CAPSULE | Refills: 0 | Status: SHIPPED | OUTPATIENT
Start: 2024-12-13 | End: 2025-03-13

## 2024-12-23 ENCOUNTER — APPOINTMENT (OUTPATIENT)
Dept: GASTROENTEROLOGY | Facility: EXTERNAL LOCATION | Age: 39
End: 2024-12-23
Payer: COMMERCIAL

## 2025-02-06 ENCOUNTER — APPOINTMENT (OUTPATIENT)
Dept: GASTROENTEROLOGY | Facility: CLINIC | Age: 40
End: 2025-02-06
Payer: COMMERCIAL

## 2025-02-06 VITALS
HEART RATE: 71 BPM | OXYGEN SATURATION: 95 % | BODY MASS INDEX: 29.94 KG/M2 | DIASTOLIC BLOOD PRESSURE: 78 MMHG | HEIGHT: 74 IN | SYSTOLIC BLOOD PRESSURE: 128 MMHG | WEIGHT: 233.3 LBS | TEMPERATURE: 97.7 F

## 2025-02-06 DIAGNOSIS — K76.0 STEATOSIS OF LIVER: Primary | ICD-10-CM

## 2025-02-06 DIAGNOSIS — K21.00 GASTROESOPHAGEAL REFLUX DISEASE WITH ESOPHAGITIS WITHOUT HEMORRHAGE: ICD-10-CM

## 2025-02-06 PROCEDURE — 99214 OFFICE O/P EST MOD 30 MIN: CPT | Performed by: INTERNAL MEDICINE

## 2025-02-06 PROCEDURE — 3008F BODY MASS INDEX DOCD: CPT | Performed by: INTERNAL MEDICINE

## 2025-02-06 PROCEDURE — 1036F TOBACCO NON-USER: CPT | Performed by: INTERNAL MEDICINE

## 2025-02-06 ASSESSMENT — ENCOUNTER SYMPTOMS
NERVOUS/ANXIOUS: 1
UNEXPECTED WEIGHT CHANGE: 0
BACK PAIN: 0
PALPITATIONS: 0
SHORTNESS OF BREATH: 0
COUGH: 0
ARTHRALGIAS: 0
FATIGUE: 0
FEVER: 0
ROS GI COMMENTS: AS IN HPI
CHILLS: 0

## 2025-02-06 NOTE — PATIENT INSTRUCTIONS
Labs as ordered, check Fibroscan.  Read up on Rezdiffra.  Schedule EGD, continue omeprazole twice daily. Return to clinic in 4 months.

## 2025-02-06 NOTE — PROGRESS NOTES
Gastroenterology Office Visit     History of Present Illness:   Since last OV in 11/23, patient has continued omeprazole twice daily before meals.  No b/t HB while on this regimen.  Was having HB daily/nightly prior.  Significantly improved.      Wants to discuss higher transaminases drawn last month.  Usually drinks 10-12 beers 3-4x/wk.  Did dry January after his labs last month.  Has continued abstinence into February.          OV in 11/23:  Michael Thomas is a 38 y.o. male who presents to GI clinic for reflux esophagitis.  Had two EGDs in 5/22 and 7/22 at [facility]; see below for results.  He was given omeprazole 40 mg daily for 1 month after his second EGD.  He has not taken any PPI since.  He had no follow-up with [facility] after that.  Currently, he may go a week without any heartburn.  But then he will have it 3 or 4 days in a row.  No particular time of day.  It has been largely inconsistent.  He does drink alcohol socially, and 3 large cups of coffee daily.  He denies associated dysphagia, hematemesis, regurgitation and wt loss.       Has gained 20 lbs over 1 yr.     Review of Systems  Review of Systems   Constitutional:  Negative for chills, fatigue, fever and unexpected weight change.   Respiratory:  Negative for cough and shortness of breath.    Cardiovascular:  Negative for chest pain, palpitations and leg swelling.   Gastrointestinal:         As in HPI   Musculoskeletal:  Negative for arthralgias, back pain and gait problem.   Psychiatric/Behavioral:  The patient is nervous/anxious.            Past Medical History  Anxiety  Depression  GERD  Aflutter     Problem List  Patient Active Problem List   Diagnosis    Anxiety and depression    Atrial arrhythmia    Atrial flutter (Multi)    Dizziness    Gastroesophageal reflux disease with esophagitis    Hemophthalmos    Injury of left ankle    Insomnia    Lumbar sprain    Paroxysmal atrial fibrillation (Multi)    Hyperglycemia    Rash    Steatosis of liver  "   Weight gain           Past Surgical History  EGD    Social History   reports that he has never smoked. He has never used smokeless tobacco. He reports current alcohol use. He reports that he does not currently use drugs.     Family History     Denies FH CRC, IBD, celiac disease    Allergies  No Known Allergies    Medications  Current Outpatient Medications on File Prior to Visit   Medication Sig Dispense Refill    albuterol 90 mcg/actuation inhaler Inhale 2 puffs 4 times a day as needed.      omeprazole (PriLOSEC) 40 mg DR capsule Take 1 capsule (40 mg) by mouth 2 times a day before meals. Do not crush or chew. 180 capsule 0    ondansetron ODT (Zofran-ODT) 4 mg disintegrating tablet DISSOLVE 1 TABLET BY MOUTH EVERY 6 HOURS AS NEEDED FOR NAUSEA/VOMITING (Patient not taking: Reported on 2/6/2025)       No current facility-administered medications on file prior to visit.            Objective   /78   Pulse 71   Temp 36.5 °C (97.7 °F) (Temporal)   Ht 1.88 m (6' 2\")   Wt 106 kg (233 lb 4.8 oz)   SpO2 95%   BMI 29.95 kg/m²          LABS  Component      Latest Ref Rn 7/20/2023   WBC      4.4 - 11.3 x10E9/L 5.0    RBC      4.50 - 5.90 x10E12/L 4.63    HEMOGLOBIN      13.5 - 17.5 g/dL 14.6    HEMATOCRIT      41.0 - 52.0 % 43.6    MCV      80 - 100 fL 94    MCHC      32.0 - 36.0 g/dL 33.5    Platelets      150 - 450 x10E9/L 226        Component      Latest Ref Rn 7/20/2023   GLUCOSE      74 - 99 mg/dL 86    SODIUM      136 - 145 mmol/L 140    POTASSIUM      3.5 - 5.3 mmol/L 4.1    CHLORIDE      98 - 107 mmol/L 99    Bicarbonate      21 - 32 mmol/L 29    Anion Gap      10 - 20 mmol/L 16    Blood Urea Nitrogen      6 - 23 mg/dL 8    Creatinine      0.50 - 1.30 mg/dL 0.88    GFR MALE      >90 mL/min/1.73m2 >90    Calcium      8.6 - 10.3 mg/dL 10.3    Albumin      3.4 - 5.0 g/dL 5.0    Alkaline Phosphatase      33 - 120 U/L 102    Total Protein      6.4 - 8.2 g/dL 8.0    AST      9 - 39 U/L 58 (H)    Bilirubin " Total      0.0 - 1.2 mg/dL 0.8    ALT      10 - 52 U/L 91 (H)       [facility] labs 1/3/25:           Radiology  RUQ US 7/23: +HM, steatosis    Endoscopy    EGD 7/22: normal S/D; grade D esophagitis; stenosis at [facility]J- not dilated  EGD 5/22 at [facility]: esophageal ulcers    EGD 3/24: 3-cm HH, grade C esophagitis     Assessment/Plan   Michael Thomas is a 39 y.o. male who presents to GI clinic for follow up of GERD/ reflux esophagitis, and abnormal LFTs.    Steatosis of liver- probably combination of NAFLD/ EtOH.   Labs as ordered, check Fibroscan.  Read up on Rezdiffra.  Continue abstinence.     Gastroesophageal reflux disease with esophagitis  Schedule EGD, continue omeprazole twice daily. Return to clinic in 4 months.        Umair Pichardo MD

## 2025-02-07 LAB
A1AT SERPL-MCNC: 152 MG/DL (ref 83–199)
ALBUMIN SERPL-MCNC: 5.2 G/DL (ref 3.6–5.1)
ALBUMIN/GLOB SERPL: 1.9 (CALC) (ref 1–2.5)
ALP SERPL-CCNC: 79 U/L (ref 36–130)
ALT SERPL-CCNC: 43 U/L (ref 9–46)
ANA SER QL IF: NEGATIVE
AST SERPL-CCNC: 27 U/L (ref 10–40)
BILIRUB DIRECT SERPL-MCNC: 0.1 MG/DL
BILIRUB INDIRECT SERPL-MCNC: 0.7 MG/DL (CALC) (ref 0.2–1.2)
BILIRUB SERPL-MCNC: 0.8 MG/DL (ref 0.2–1.2)
CERULOPLASMIN SERPL-MCNC: 26 MG/DL (ref 14–30)
FERRITIN SERPL-MCNC: 239 NG/ML (ref 38–380)
GLOBULIN SER CALC-MCNC: 2.7 G/DL (CALC) (ref 1.9–3.7)
HAV IGM SERPL QL IA: NORMAL
HBV CORE IGM SERPL QL IA: NORMAL
HBV SURFACE AG SERPL QL IA: NORMAL
HCV AB SERPL QL IA: NORMAL
IRON SATN MFR SERPL: 31 % (CALC) (ref 20–48)
IRON SERPL-MCNC: 110 MCG/DL (ref 50–180)
MITOCHONDRIA AB SER QL IF: NEGATIVE
PROT SERPL-MCNC: 7.9 G/DL (ref 6.1–8.1)
SMOOTH MUSCLE AB SER QL IF: NEGATIVE
TIBC SERPL-MCNC: 357 MCG/DL (CALC) (ref 250–425)

## 2025-02-10 ENCOUNTER — ANESTHESIA EVENT (OUTPATIENT)
Dept: GASTROENTEROLOGY | Facility: EXTERNAL LOCATION | Age: 40
End: 2025-02-10

## 2025-02-28 ENCOUNTER — ANESTHESIA (OUTPATIENT)
Dept: GASTROENTEROLOGY | Facility: EXTERNAL LOCATION | Age: 40
End: 2025-02-28

## 2025-02-28 ENCOUNTER — APPOINTMENT (OUTPATIENT)
Dept: GASTROENTEROLOGY | Facility: EXTERNAL LOCATION | Age: 40
End: 2025-02-28
Payer: COMMERCIAL

## 2025-02-28 VITALS
WEIGHT: 240 LBS | DIASTOLIC BLOOD PRESSURE: 81 MMHG | RESPIRATION RATE: 15 BRPM | HEART RATE: 80 BPM | SYSTOLIC BLOOD PRESSURE: 128 MMHG | BODY MASS INDEX: 30.8 KG/M2 | TEMPERATURE: 98.2 F | OXYGEN SATURATION: 100 % | HEIGHT: 74 IN

## 2025-02-28 DIAGNOSIS — K21.00 GASTROESOPHAGEAL REFLUX DISEASE WITH ESOPHAGITIS WITHOUT HEMORRHAGE: ICD-10-CM

## 2025-02-28 PROCEDURE — 43239 EGD BIOPSY SINGLE/MULTIPLE: CPT | Performed by: INTERNAL MEDICINE

## 2025-02-28 RX ORDER — LIDOCAINE HYDROCHLORIDE 20 MG/ML
INJECTION, SOLUTION INFILTRATION; PERINEURAL AS NEEDED
Status: DISCONTINUED | OUTPATIENT
Start: 2025-02-28 | End: 2025-02-28

## 2025-02-28 RX ORDER — ONDANSETRON HYDROCHLORIDE 2 MG/ML
4 INJECTION, SOLUTION INTRAVENOUS ONCE AS NEEDED
Status: DISCONTINUED | OUTPATIENT
Start: 2025-02-28 | End: 2025-03-01 | Stop reason: HOSPADM

## 2025-02-28 RX ORDER — PROPOFOL 10 MG/ML
INJECTION, EMULSION INTRAVENOUS AS NEEDED
Status: DISCONTINUED | OUTPATIENT
Start: 2025-02-28 | End: 2025-02-28

## 2025-02-28 RX ADMIN — PROPOFOL 50 MG: 10 INJECTION, EMULSION INTRAVENOUS at 11:08

## 2025-02-28 RX ADMIN — PROPOFOL 50 MG: 10 INJECTION, EMULSION INTRAVENOUS at 11:09

## 2025-02-28 RX ADMIN — PROPOFOL 100 MG: 10 INJECTION, EMULSION INTRAVENOUS at 11:07

## 2025-02-28 RX ADMIN — PROPOFOL 50 MG: 10 INJECTION, EMULSION INTRAVENOUS at 11:10

## 2025-02-28 RX ADMIN — LIDOCAINE HYDROCHLORIDE 2 ML: 20 INJECTION, SOLUTION INFILTRATION; PERINEURAL at 11:06

## 2025-02-28 RX ADMIN — PROPOFOL 150 MG: 10 INJECTION, EMULSION INTRAVENOUS at 11:06

## 2025-02-28 SDOH — HEALTH STABILITY: MENTAL HEALTH: CURRENT SMOKER: 0

## 2025-02-28 ASSESSMENT — PAIN SCALES - GENERAL
PAINLEVEL_OUTOF10: 0 - NO PAIN
PAIN_LEVEL: 0
PAINLEVEL_OUTOF10: 0 - NO PAIN

## 2025-02-28 ASSESSMENT — PAIN - FUNCTIONAL ASSESSMENT
PAIN_FUNCTIONAL_ASSESSMENT: 0-10

## 2025-02-28 ASSESSMENT — COLUMBIA-SUICIDE SEVERITY RATING SCALE - C-SSRS
1. IN THE PAST MONTH, HAVE YOU WISHED YOU WERE DEAD OR WISHED YOU COULD GO TO SLEEP AND NOT WAKE UP?: NO
6. HAVE YOU EVER DONE ANYTHING, STARTED TO DO ANYTHING, OR PREPARED TO DO ANYTHING TO END YOUR LIFE?: NO
2. HAVE YOU ACTUALLY HAD ANY THOUGHTS OF KILLING YOURSELF?: NO

## 2025-02-28 NOTE — ANESTHESIA POSTPROCEDURE EVALUATION
Patient: Michael Thomas    Procedure Summary       Date: 02/28/25 Room / Location: [facility]    Anesthesia Start: 1104 Anesthesia Stop: 1117    Procedure: EGD Diagnosis: Gastroesophageal reflux disease with esophagitis without hemorrhage    Scheduled Providers: Umair Pichardo MD Responsible Provider: MARY Arguello    Anesthesia Type: MAC ASA Status: 2            Anesthesia Type: MAC    Vitals Value Taken Time   /81 02/28/25 1136   Temp 36.8 °C (98.2 °F) 02/28/25 1116   Pulse 80 02/28/25 1136   Resp 15 02/28/25 1136   SpO2 100 % 02/28/25 1136       Anesthesia Post Evaluation    Patient location during evaluation: PACU  Patient participation: waiting for patient participation  Level of consciousness: responsive to verbal stimuli  Pain score: 0  Pain management: adequate  Airway patency: patent  Cardiovascular status: blood pressure returned to baseline  Respiratory status: acceptable  Hydration status: acceptable  Postoperative Nausea and Vomiting: none    No notable events documented.

## 2025-02-28 NOTE — ANESTHESIA PREPROCEDURE EVALUATION
Patient: Michael Thomas    Procedure Information       Date/Time: 02/28/25 1100    Scheduled providers: Umair Pichardo MD    Procedure: EGD    Location: [facility]            Relevant Problems   Cardiac   (+) Atrial arrhythmia   (+) Atrial flutter (Multi)   (+) Paroxysmal atrial fibrillation (Multi)      Neuro   (+) Anxiety and depression      GI   (+) Gastroesophageal reflux disease with esophagitis      Liver   (+) Steatosis of liver      Skin   (+) Rash       Clinical information reviewed:   Tobacco  Allergies  Meds   Med Hx  Surg Hx   Fam Hx  Soc Hx        NPO Detail:  NPO/Void Status  Date of Last Liquid: 02/27/25  Time of Last Liquid: 2330  Date of Last Solid: 02/27/25  Time of Last Solid: 1900  Last Intake Type: Clear fluids         Physical Exam    Airway  Mallampati: I  TM distance: >3 FB  Neck ROM: full     Cardiovascular - normal exam     Dental - normal exam     Pulmonary - normal exam     Abdominal - normal exam         Anesthesia Plan    History of general anesthesia?: yes  History of complications of general anesthesia?: no    ASA 2     MAC     The patient is not a current smoker.  Patient did not smoke on day of procedure.    intravenous induction   Anesthetic plan and risks discussed with patient.

## 2025-02-28 NOTE — H&P
Outpatient Hospital Procedure H&P    Patient Profile-Procedures  Initial Info  Patient Demographics  Name Michael Thomas  Date of Birth 1985  MRN [id number]  Address   [address] [id number] [facility] [address]    Primary Phone Number [phone]  Secondary Phone Number    PCP Jonh Mckenna    Procedure(s):  EGD  Primary contact name and number   Extended Emergency Contact Information  Primary Emergency Contact: Kiersten Cavanaugh   [place]  Mobile Phone: [phone]  Relation: Significant Other    General Health  Weight   Vitals:    02/28/25 1045   Weight: 109 kg (240 lb)     BMI Body mass index is 30.81 kg/m².    Allergies  No Known Allergies    Past Medical History   Past Medical History:   Diagnosis Date    GERD (gastroesophageal reflux disease)        Provider assessment  Diagnosis: reflux esophagitis    Medication Reviewed - yes  Prior to Admission medications    Medication Sig Start Date End Date Taking? Authorizing Provider   omeprazole (PriLOSEC) 40 mg DR capsule Take 1 capsule (40 mg) by mouth 2 times a day before meals. Do not crush or chew. 12/13/24 3/13/25 Yes Umair Pichardo MD   albuterol 90 mcg/actuation inhaler Inhale 2 puffs 4 times a day as needed. 2/6/24   Historical Provider, MD   ondansetron ODT (Zofran-ODT) 4 mg disintegrating tablet DISSOLVE 1 TABLET BY MOUTH EVERY 6 HOURS AS NEEDED FOR NAUSEA/VOMITING  Patient not taking: Reported on 2/6/2025 2/6/24   Historical Provider, MD       Physical Exam  Vitals:    02/28/25 1045   BP: (!) 145/97   Pulse: 89   Resp: 15   Temp: 36.7 °C (98.1 °F)   SpO2: 99%        General: A&Ox3, NAD.  CV: RRR. No murmur.  Resp: CTA bilaterally. No wheezing, rhonchi or rales.   Extrem: No edema.       Oropharyngeal Classification II (hard and soft palate, upper portion of tonsils and uvula visible)  ASA PS Classification 2  Sedation Plan Deep  Procedure Plan - pre-procedural (re)assesment  completed by physician:  discharge/transfer patient when discharge criteria met    Umair Pichardo MD  2/28/2025 10:59 AM

## 2025-02-28 NOTE — DISCHARGE INSTRUCTIONS

## 2025-03-13 LAB
LABORATORY COMMENT REPORT: NORMAL
PATH REPORT.FINAL DX SPEC: NORMAL
PATH REPORT.GROSS SPEC: NORMAL
PATH REPORT.TOTAL CANCER: NORMAL

## 2025-03-20 DIAGNOSIS — K21.00 GASTROESOPHAGEAL REFLUX DISEASE WITH ESOPHAGITIS WITHOUT HEMORRHAGE: ICD-10-CM

## 2025-03-20 RX ORDER — OMEPRAZOLE 40 MG/1
40 CAPSULE, DELAYED RELEASE ORAL
Qty: 180 CAPSULE | Refills: 0 | Status: SHIPPED | OUTPATIENT
Start: 2025-03-20 | End: 2025-06-18

## 2025-05-23 ENCOUNTER — OFFICE VISIT (OUTPATIENT)
Dept: CARDIOLOGY | Facility: CLINIC | Age: 40
End: 2025-05-23
Payer: COMMERCIAL

## 2025-05-23 VITALS
HEIGHT: 74 IN | BODY MASS INDEX: 28.88 KG/M2 | DIASTOLIC BLOOD PRESSURE: 98 MMHG | HEART RATE: 90 BPM | OXYGEN SATURATION: 95 % | SYSTOLIC BLOOD PRESSURE: 155 MMHG | WEIGHT: 225 LBS

## 2025-05-23 DIAGNOSIS — R07.9 CHEST PAIN, UNSPECIFIED TYPE: ICD-10-CM

## 2025-05-23 DIAGNOSIS — I48.0 PAROXYSMAL ATRIAL FIBRILLATION (MULTI): Primary | ICD-10-CM

## 2025-05-23 PROCEDURE — 93005 ELECTROCARDIOGRAM TRACING: CPT | Performed by: INTERNAL MEDICINE

## 2025-05-23 PROCEDURE — 99214 OFFICE O/P EST MOD 30 MIN: CPT | Performed by: INTERNAL MEDICINE

## 2025-05-23 PROCEDURE — 99204 OFFICE O/P NEW MOD 45 MIN: CPT | Performed by: INTERNAL MEDICINE

## 2025-05-23 PROCEDURE — 3008F BODY MASS INDEX DOCD: CPT | Performed by: INTERNAL MEDICINE

## 2025-05-23 PROCEDURE — 1036F TOBACCO NON-USER: CPT | Performed by: INTERNAL MEDICINE

## 2025-05-23 RX ORDER — ROSUVASTATIN CALCIUM 20 MG/1
20 TABLET, COATED ORAL DAILY
Qty: 30 TABLET | Refills: 11 | Status: SHIPPED | OUTPATIENT
Start: 2025-05-23 | End: 2026-05-23

## 2025-05-23 NOTE — PROGRESS NOTES
Name : Michael Thomas    : 1985   MRN : [id number]   ENC Date : 25     Reason for visit: Chest pain dyslipidemia    Assessment and Plan:  Chest pain: Patient symptoms are fairly atypical.  EKG is unremarkable.  More worried about hypertensive response to exercise in a.m. significant coronary artery disease.  I do think a treadmill ECG would be wise here given his significant LDL elevation in the past and evidence of some coronary artery calcification on CT scan a couple of years ago.  I do not think he needs imaging.  Dyslipidemia: LDL was greater than 170 2 years ago.  He has evidence of fatty liver disease and some evidence of coronary calcium by CT scan of the chest in the past.  Recommend get a coronary CT calcium score for baseline risk assessment.  At this point I would recommend starting him on statin therapy.  Patient was agreeable.  We started him on rosuvastatin 20 mg daily.  Repeat lipids in 3-4 months.  Alcohol use: Patient never answered exactly how much alcohol he has been using.  He states that his alcohol use waxes and wanes.  I did explain that this could lead to more fatty liver changes, could cause recurrent atrial fibrillation, and can cause cardiomyopathy.  He has no signs or symptoms of cardiomyopathy at this point so I do not think an echocardiogram is needed.  If the CT scan of the chest described above suggest cardiomegaly then of course we can get an echocardiogram to better quantify this.  Paroxysmal atrial fibrillation: No clinical recurrence since 2016.  Outside of the alcohol use he seems to be low risk for recurrence.  Agree he does not need flecainide nor oral anticoagulation.  Hypertension: Blood pressure was acceptable back in February.  It is elevated today.  We need to have another documented blood pressure elevation before we would start therapy.  As above I want to see what his blood pressure responses to exercise.  Disp: Phone follow-up with results of  stress test and CT coronary calcium score.      HPI:  Patient is seen today for evaluation of chest discomfort.  Patient had an episode of atrial fibrillation in 2016 when he would have only been 32 years old.  At that time he had an echocardiogram performed which showed no valvular heart disease and structurally normal heart with normal LV function.  He believes this was related to excessive caffeine use and lack of sleep.  He was treated briefly with verapamil flecainide and oral anticoagulation.  He spontaneously converted back to sinus rhythm.  He stopped these medications shortly afterwards.  He has done well from an arrhythmia standpoint.  No recurrent atrial fibrillation.  Several weeks ago he had an episode of chest discomfort that occurred at rest.  He described a sharp pain in the left upper chest that spontaneously resolved.  It did not radiate to neck or jaw.  He also has a history of Kwong's esophagus but this symptom was different than his esophageal symptoms he has had in the past.  No TIA or CVA-like symptoms.  He has had issues with elevated LFTs and hepatic steatosis documented by CT scan in the past.  His LFT elevation apparently corresponded to a period of time when he was having increased alcohol intake.  His alcohol intake was difficult to pin down he never really tell me exactly how much he drank but he does state that it goes up and down.  We reviewed the risks of alcohol use in terms of atrial fibrillation and cardiomyopathy along with liver disease in detail today.      Problem List: Problem List[1]     Meds: Medications Ordered Prior to Encounter[2]    All: Allergies[3]    Fam Hx: Family History[4]    Soc Hx:   Social History     Socioeconomic History    Marital status: Single     Spouse name: Not on file    Number of children: Not on file    Years of education: Not on file    Highest education level: Not on file   Occupational History    Not on file   Tobacco Use    Smoking status:  Never    Smokeless tobacco: Never   Substance and Sexual Activity    Alcohol use: Yes     Comment: SOCIAL    Drug use: Not Currently    Sexual activity: Not on file   Other Topics Concern    Not on file   Social History Narrative    Not on file     Social Drivers of Health     Financial Resource Strain: Low Risk  (11/1/2022)    Received from [facility]    Overall Financial Resource Strain (CARDIA)     Difficulty of Paying Living Expenses: Not hard at all   Food Insecurity: No Food Insecurity (11/1/2022)    Received from [facility]    Hunger Vital Sign     Worried About Running Out of Food in the Last Year: Never true     Ran Out of Food in the Last Year: Never true   Transportation Needs: No Transportation Needs (11/1/2022)    Received from [facility]    PRAPARE - Transportation     Lack of Transportation (Medical): No     Lack of Transportation (Non-Medical): No   Physical Activity: Sufficiently Active (11/1/2022)    Received from [facility]    Exercise Vital Sign     Days of Exercise per Week: 5 days     Minutes of Exercise per Session: 60 min   Stress: Stress Concern Present (11/1/2022)    Received from [facility]    [place] [employer] - Occupational Stress Questionnaire     Feeling of Stress : To some extent   Social Connections: Moderately Integrated (11/1/2022)    Received from [facility]    Social Connection and Isolation Panel [NHANES]     Frequency of Communication with Friends and Family: More than three times a week     Frequency of Social Gatherings with Friends and Family: Twice a week     Attends [employer] Services: More than 4 times per year     Active Member of Clubs or Organizations: Yes     Attends Club or Organization Meetings: 1 to 4 times per year     Marital Status:    Intimate Partner Violence: Not on file   Housing Stability: Low Risk  (11/1/2022)    Received from [facility]    Housing Stability Vital Sign     Unable to  "Pay for Housing in the Last Year: No     Number of Places Lived in the Last Year: 1     Unstable Housing in the Last Year: No       VS: BP (!) 155/98 (BP Location: Right arm, Patient Position: Sitting)   Pulse 90   Ht 1.88 m (6' 2\")   Wt 102 kg (225 lb)   SpO2 95%   BMI 28.89 kg/m²      Physical Exam  Vitals reviewed.   Constitutional:       Appearance: Normal appearance.   Eyes:      Pupils: Pupils are equal, round, and reactive to light.   Neck:      Vascular: No JVD.   Cardiovascular:      Rate and Rhythm: Normal rate and regular rhythm.      Pulses: Normal pulses.      Heart sounds: No murmur heard.     No gallop.   Pulmonary:      Effort: No respiratory distress.      Breath sounds: No wheezing or rales.   Abdominal:      General: Abdomen is flat. There is no distension.      Palpations: Abdomen is soft.   Musculoskeletal:         General: No swelling.      Right lower leg: No edema.      Left lower leg: No edema.   Neurological:      General: No focal deficit present.      Mental Status: He is alert.   Psychiatric:         Mood and Affect: Mood normal.          EC.23.25: Normal sinus rhythm.  Borderline left atrial enlargement probably normal variant.  Otherwise unremarkable    John Villareal MD        [1]   Patient Active Problem List  Diagnosis    Anxiety and depression    Atrial arrhythmia    Atrial flutter (Multi)    Dizziness    Gastroesophageal reflux disease with esophagitis    Hemophthalmos    Injury of left ankle    Insomnia    Lumbar sprain    Paroxysmal atrial fibrillation (Multi)    Hyperglycemia    Rash    Steatosis of liver    Weight gain   [2]   Current Outpatient Medications on File Prior to Visit   Medication Sig Dispense Refill    omeprazole (PriLOSEC) 40 mg DR capsule TAKE 1 CAPSULE (40 MG) BY MOUTH 2 TIMES A DAY BEFORE MEALS. DO NOT CRUSH OR CHEW. 180 capsule 0    [DISCONTINUED] albuterol 90 mcg/actuation inhaler Inhale 2 puffs 4 times a day as needed.      [DISCONTINUED] " ondansetron ODT (Zofran-ODT) 4 mg disintegrating tablet DISSOLVE 1 TABLET BY MOUTH EVERY 6 HOURS AS NEEDED FOR NAUSEA/VOMITING (Patient not taking: Reported on 2/6/2025)       No current facility-administered medications on file prior to visit.   [3] No Known Allergies  [4] No family history on file.

## 2025-05-27 LAB
ATRIAL RATE: 85 BPM
P AXIS: 6 DEGREES
P OFFSET: 203 MS
P ONSET: 153 MS
PR INTERVAL: 132 MS
Q ONSET: 219 MS
QRS COUNT: 14 BEATS
QRS DURATION: 82 MS
QT INTERVAL: 352 MS
QTC CALCULATION(BAZETT): 418 MS
QTC FREDERICIA: 395 MS
R AXIS: -8 DEGREES
T AXIS: 4 DEGREES
T OFFSET: 395 MS
VENTRICULAR RATE: 85 BPM

## 2025-06-01 ENCOUNTER — OFFICE VISIT (OUTPATIENT)
Dept: URGENT CARE | Age: 40
End: 2025-06-01
Payer: COMMERCIAL

## 2025-06-01 VITALS
OXYGEN SATURATION: 97 % | RESPIRATION RATE: 19 BRPM | HEART RATE: 107 BPM | HEIGHT: 74 IN | WEIGHT: 225 LBS | TEMPERATURE: 98 F | BODY MASS INDEX: 28.88 KG/M2 | DIASTOLIC BLOOD PRESSURE: 102 MMHG | SYSTOLIC BLOOD PRESSURE: 160 MMHG

## 2025-06-01 DIAGNOSIS — S05.02XA ABRASION OF LEFT CORNEA, INITIAL ENCOUNTER: Primary | ICD-10-CM

## 2025-06-01 RX ORDER — KETOROLAC TROMETHAMINE 4 MG/ML
1 SOLUTION/ DROPS OPHTHALMIC EVERY 6 HOURS PRN
Qty: 5 ML | Refills: 0 | Status: SHIPPED | OUTPATIENT
Start: 2025-06-01 | End: 2025-06-06

## 2025-06-01 RX ORDER — MOXIFLOXACIN 5 MG/ML
1 SOLUTION/ DROPS OPHTHALMIC 3 TIMES DAILY
Qty: 3 ML | Refills: 0 | Status: SHIPPED | OUTPATIENT
Start: 2025-06-01 | End: 2025-06-08

## 2025-06-01 RX ORDER — MOXIFLOXACIN 5 MG/ML
1 SOLUTION/ DROPS OPHTHALMIC 3 TIMES DAILY
Qty: 3 ML | Refills: 0 | Status: SHIPPED | OUTPATIENT
Start: 2025-06-01 | End: 2025-06-01

## 2025-06-01 RX ORDER — KETOROLAC TROMETHAMINE 4 MG/ML
1 SOLUTION/ DROPS OPHTHALMIC EVERY 6 HOURS PRN
Qty: 5 ML | Refills: 0 | Status: SHIPPED | OUTPATIENT
Start: 2025-06-01 | End: 2025-06-01

## 2025-06-01 ASSESSMENT — PATIENT HEALTH QUESTIONNAIRE - PHQ9
2. FEELING DOWN, DEPRESSED OR HOPELESS: NOT AT ALL
1. LITTLE INTEREST OR PLEASURE IN DOING THINGS: NOT AT ALL
SUM OF ALL RESPONSES TO PHQ9 QUESTIONS 1 AND 2: 0

## 2025-06-01 NOTE — PROGRESS NOTES
"Subjective   Patient ID: Michael Thomas is a 40 y.o. male. They present today with a chief complaint of Eye Exam (Left thinks there is something in it ).    History of Present Illness  Patient reports symptoms present for ~1 day of symptoms  Notes left eye pain and sensation that something is in it  Notes slight watery discharge  Denies photophobia  Denies vision loss  Denies double vision  Denies discharge  Denies hx of eye problems  Patient reports that he is leaving tomorrow for [place] and states that he'll go to the ER if symptoms at all worsen while he is there      Past Medical History  Allergies as of 06/01/2025    (No Known Allergies)       Prescriptions Prior to Admission[1]     Medical History[2]    Surgical History[3]     reports that he has never smoked. He has never been exposed to tobacco smoke. He has never used smokeless tobacco. He reports current alcohol use. He reports that he does not currently use drugs.                               Objective    Vitals:    06/01/25 1851   BP: (!) 160/102   Pulse: 107   Resp: 19   Temp: 36.7 °C (98 °F)   SpO2: 97%   Weight: 102 kg (225 lb)   Height: 1.88 m (6' 2\")     No LMP for male patient.    Physical Exam  Constitutional:       General: He is not in acute distress.     Appearance: Normal appearance. He is not toxic-appearing or diaphoretic.   HENT:      Nose: No rhinorrhea.   Eyes:      General: No scleral icterus.        Right eye: No discharge.         Left eye: Discharge (watery) present.     Extraocular Movements: Extraocular movements intact.      Right eye: Normal extraocular motion and no nystagmus.      Left eye: Normal extraocular motion and no nystagmus.      Conjunctiva/sclera:      Right eye: Right conjunctiva is not injected. No exudate.     Left eye: Left conjunctiva is injected. No exudate.       Comments: Wood's lamp positive for fluorescein stain  ~2-3x2-3mm  No discharge     Pulmonary:      Effort: Pulmonary effort is normal. "   Musculoskeletal:      Cervical back: Normal range of motion.   Neurological:      Mental Status: He is alert.   Psychiatric:         Mood and Affect: Mood normal.         Behavior: Behavior normal.         Thought Content: Thought content normal.      Comments: Pleasant         Procedures    Point of Care Test & Imaging Results from this visit:      Diagnostic study results (if any) were reviewed by Domingo Raza MD.    Assessment/Plan   Allergies, medications, history, and pertinent labs/EKGs/Imaging reviewed by Domingo Raza MD.     Medical Decision Making:    ***    Orders and Diagnoses  Diagnoses and all orders for this visit:  Abrasion of left cornea, initial encounter  -     Referral to Ophthalmology; Future  -     moxifloxacin (Vigamox) 0.5 % ophthalmic solution; Administer 1 drop into affected eye(s) 3 times a day for 7 days.  -     ketorolac (Acular) 0.4 % ophthalmic solution; Administer 1 drop into affected eye(s) every 6 hours if needed (eye pain) for up to 5 days.      Patient disposition: { Disposition:98831}      Medical Admin Record      Follow Up Instructions  No follow-ups on file.    Electronically signed by Domingo Raza MD  7:46 PM             [1] (Not in a hospital admission)   [2]   Past Medical History:  Diagnosis Date    GERD (gastroesophageal reflux disease)    [3] No past surgical history on file.

## 2025-06-06 ENCOUNTER — TELEMEDICINE (OUTPATIENT)
Dept: PRIMARY CARE | Facility: CLINIC | Age: 40
End: 2025-06-06
Payer: COMMERCIAL

## 2025-06-06 DIAGNOSIS — R23.8 BENIGN PAPULE: Primary | ICD-10-CM

## 2025-06-06 PROCEDURE — 99213 OFFICE O/P EST LOW 20 MIN: CPT

## 2025-06-06 NOTE — PROGRESS NOTES
Virtual or Telephone Consent    An interactive audio and video telecommunication system which permits real time communications between the patient (at the originating site) and provider (at the distant site) was utilized to provide this telehealth service.   Verbal consent was requested and obtained from Michael Thomas on this date, 06/06/25 for a telehealth visit and the patient's location was confirmed at the time of the visit.     This is a telehealth conducted visit through audio/video communication through [employer] telehealth services. Verified patient location is in the current [facility] during time of this assessment.    Subjective   Michael Thomas is a 40 y.o. male who presents for No chief complaint on file..  He is presenting for a virtual visit today with complaints of a left hand bump. He is insisting to have this removed. It is non-itchy or painful or draining. 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         ROS negative unless otherwise stated in HPI.     There were no vitals taken for this visit.   Objective        Physical Exam  Constitutional:       Appearance: Normal appearance.   Pulmonary:      Effort: Pulmonary effort is normal.   Skin:     Comments: Video visualization: small round circular erythematous papule dorsal left hand    Neurological:      Mental Status: He is alert.   Psychiatric:         Mood and Affect: Mood normal.         Behavior: Behavior normal.         Assessment & Plan  Benign papule  This appears to be a superficial benign lesion on appearance in the video visit. He is insisting on removing this. Will refer him to dermatology for removal of skin lesion. No other concerns today and all questions were answered in the visit.   Orders:    Referral to Dermatology     Estimated time of visit: 10-12 minutes

## 2025-06-25 ENCOUNTER — HOSPITAL ENCOUNTER (OUTPATIENT)
Dept: CARDIOLOGY | Facility: CLINIC | Age: 40
Discharge: HOME | End: 2025-06-25
Payer: COMMERCIAL

## 2025-06-25 DIAGNOSIS — K21.00 GASTROESOPHAGEAL REFLUX DISEASE WITH ESOPHAGITIS WITHOUT HEMORRHAGE: ICD-10-CM

## 2025-06-25 DIAGNOSIS — R07.9 CHEST PAIN, UNSPECIFIED TYPE: ICD-10-CM

## 2025-06-25 DIAGNOSIS — I48.0 PAROXYSMAL ATRIAL FIBRILLATION (MULTI): ICD-10-CM

## 2025-06-25 PROCEDURE — 93018 CV STRESS TEST I&R ONLY: CPT | Performed by: INTERNAL MEDICINE

## 2025-06-25 PROCEDURE — 93016 CV STRESS TEST SUPVJ ONLY: CPT | Performed by: INTERNAL MEDICINE

## 2025-06-25 PROCEDURE — 93017 CV STRESS TEST TRACING ONLY: CPT

## 2025-06-26 ENCOUNTER — TELEPHONE (OUTPATIENT)
Dept: CARDIOLOGY | Facility: CLINIC | Age: 40
End: 2025-06-26
Payer: COMMERCIAL

## 2025-06-26 RX ORDER — OMEPRAZOLE 40 MG/1
40 CAPSULE, DELAYED RELEASE ORAL
Qty: 180 CAPSULE | Refills: 0 | Status: SHIPPED | OUTPATIENT
Start: 2025-06-26 | End: 2025-09-24

## 2025-06-26 NOTE — TELEPHONE ENCOUNTER
Please let patient know his stress test was normal.  His exercise time was good.  No EKG abnormalities were seen to suggest ischemia.  No arrhythmias were provoked.    SDH

## 2025-06-30 ENCOUNTER — APPOINTMENT (OUTPATIENT)
Dept: DERMATOLOGY | Facility: CLINIC | Age: 40
End: 2025-06-30
Payer: COMMERCIAL

## 2025-06-30 DIAGNOSIS — D23.9 DERMATOFIBROMA: Primary | ICD-10-CM

## 2025-06-30 DIAGNOSIS — B36.0 TINEA VERSICOLOR: ICD-10-CM

## 2025-06-30 DIAGNOSIS — Z12.83 SCREENING EXAM FOR SKIN CANCER: ICD-10-CM

## 2025-06-30 DIAGNOSIS — D22.9 NEVUS: ICD-10-CM

## 2025-06-30 PROCEDURE — 1036F TOBACCO NON-USER: CPT | Performed by: NURSE PRACTITIONER

## 2025-06-30 PROCEDURE — 99203 OFFICE O/P NEW LOW 30 MIN: CPT | Performed by: NURSE PRACTITIONER

## 2025-06-30 RX ORDER — KETOCONAZOLE 20 MG/ML
SHAMPOO, SUSPENSION TOPICAL DAILY
Qty: 120 ML | Refills: 11 | Status: SHIPPED | OUTPATIENT
Start: 2025-06-30

## 2025-06-30 NOTE — PROGRESS NOTES
Subjective     Michael Thomas is a 40 y.o. male who presents for the following: lesion  and Rash.   New patient in for lesion to right hand present for 3 to 6 months patient states that he has tried popping the area and applying Neosporin no changes to the area.    Patient would also like to address the rash present to back for 6 to 9 months no prior treatments.  Patient describes the rash as red bumps with no itch.    Review of Systems:  No other skin or systemic complaints other than what is documented elsewhere in the note.    The following portions of the chart were reviewed this encounter and updated as appropriate:       Skin Cancer History  Biopsy Log Book  No skin cancers from Specimen Tracking.    Additional History      Specialty Problems          Dermatology Problems    Rash     Past Medical History:  Michael Thomas  has a past medical history of GERD (gastroesophageal reflux disease).    Past Surgical History:  Michael Thomas  has no past surgical history on file.    Family History:  Patient family history is not on file.    Social History:  Michael Thomas  reports that he has never smoked. He has never been exposed to tobacco smoke. He has never used smokeless tobacco. He reports current alcohol use. He reports that he does not currently use drugs.    Allergies:  Patient has no known allergies.    Current Medications / CAM's:  Current Medications[1]     Objective   Well appearing patient in no apparent distress; mood and affect are within normal limits.      Assessment/Plan   Skin Exam  1. DERMATOFIBROMA (2)  Left Abdomen (side) - Upper, Left Hand - Posterior  Firm hyperpigmented papule with positive dimple sign. On dermoscopy, there is a central scar-like area with a uniform peripheral pigment network.  -Discussed nature of condition  -Reassurance, recommend observation  -Recommend that the patient avoid trauma/injury/manipulation to the area to avoid the  lesion enlarging  -Recommend the patient return for re-evaluation if the lesion enlarges, becomes painful or symptomatic, or is changing  2. TINEA VERSICOLOR  Left Upper Back, Mid Back, Right Upper Back  -Hypopigmented/hyperpigmented macules coalescing into patches  -Discussed nature of the condition  -This is a chronic condition and recurrence is likely without maintenance use of therapy.  -Recommend rx Ketoconazole 2% shampoo once daily for 2 weeks. Use as a cleanser to affected areas of the body, let sit for 2-5 minutes. Rinse thoroughly.  After two weeks of daily use, then use  once weekly as maintenance to prevent recurrence.  -Recommend rx Ketoconazole 2% cream. Apply to affected areas of skin twice a day for at least 2 weeks. After two weeks of daily use, then apply once weekly as maintenance to prevent recurrence.          Related Medications  ketoconazole (NIZOral) 2 % shampoo  Apply topically once daily. Allow to sit on back for 2-3 minutes and rinse.  3. NEVUS  Generalized  Uniform pigmented macule(s)/papule(s) with reassuring findings on dermoscopy  -Discussed nature of condition  -Reassurance, benign-appearing features on examination today  -Recommend continued observation  4. SCREENING EXAM FOR SKIN CANCER  Generalized            [1]   Current Outpatient Medications:     ketoconazole (NIZOral) 2 % shampoo, Apply topically once daily. Allow to sit on back for 2-3 minutes and rinse., Disp: 120 mL, Rfl: 11    omeprazole (PriLOSEC) 40 mg DR capsule, TAKE 1 CAPSULE (40 MG) BY MOUTH 2 TIMES A DAY BEFORE MEALS. DO NOT CRUSH OR CHEW., Disp: 180 capsule, Rfl: 0    rosuvastatin (Crestor) 20 mg tablet, Take 1 tablet (20 mg) by mouth once daily., Disp: 30 tablet, Rfl: 11

## 2025-06-30 NOTE — Clinical Note
-Discussed nature of condition  -Reassurance, recommend observation  -Recommend that the patient avoid trauma/injury/manipulation to the area to avoid the lesion enlarging  -Recommend the patient return for re-evaluation if the lesion enlarges, becomes painful or symptomatic, or is changing

## 2025-06-30 NOTE — Clinical Note
Firm hyperpigmented papule with positive dimple sign. On dermoscopy, there is a central scar-like area with a uniform peripheral pigment network.